# Patient Record
Sex: FEMALE | Race: ASIAN | URBAN - METROPOLITAN AREA
[De-identification: names, ages, dates, MRNs, and addresses within clinical notes are randomized per-mention and may not be internally consistent; named-entity substitution may affect disease eponyms.]

---

## 2018-01-21 ENCOUNTER — HOSPITAL ENCOUNTER (EMERGENCY)
Facility: CLINIC | Age: 53
Discharge: HOME | End: 2018-01-21
Attending: NURSE PRACTITIONER | Admitting: NURSE PRACTITIONER
Payer: COMMERCIAL

## 2018-01-21 ENCOUNTER — TRANSFERRED RECORDS (OUTPATIENT)
Dept: HEALTH INFORMATION MANAGEMENT | Facility: CLINIC | Age: 53
End: 2018-01-21

## 2018-01-21 VITALS
HEIGHT: 60 IN | TEMPERATURE: 98.7 F | OXYGEN SATURATION: 98 % | RESPIRATION RATE: 16 BRPM | WEIGHT: 125 LBS | DIASTOLIC BLOOD PRESSURE: 92 MMHG | SYSTOLIC BLOOD PRESSURE: 135 MMHG | BODY MASS INDEX: 24.54 KG/M2

## 2018-01-21 DIAGNOSIS — L03.213 PERIORBITAL CELLULITIS OF LEFT EYE: ICD-10-CM

## 2018-01-21 PROCEDURE — 25000125 ZZHC RX 250: Performed by: EMERGENCY MEDICINE

## 2018-01-21 PROCEDURE — 99284 EMERGENCY DEPT VISIT MOD MDM: CPT | Mod: 25

## 2018-01-21 PROCEDURE — 96372 THER/PROPH/DIAG INJ SC/IM: CPT

## 2018-01-21 PROCEDURE — 25000128 H RX IP 250 OP 636: Performed by: EMERGENCY MEDICINE

## 2018-01-21 RX ORDER — TETRACAINE HYDROCHLORIDE 5 MG/ML
SOLUTION OPHTHALMIC
Status: DISCONTINUED
Start: 2018-01-21 | End: 2018-01-21 | Stop reason: HOSPADM

## 2018-01-21 RX ORDER — CLINDAMYCIN HCL 150 MG
450 CAPSULE ORAL 3 TIMES DAILY
Qty: 90 CAPSULE | Refills: 0 | Status: SHIPPED | OUTPATIENT
Start: 2018-01-21 | End: 2018-01-31

## 2018-01-21 RX ADMIN — CEFTRIAXONE SODIUM 2 G: 1 INJECTION, POWDER, FOR SOLUTION INTRAMUSCULAR; INTRAVENOUS at 18:27

## 2018-01-21 ASSESSMENT — ENCOUNTER SYMPTOMS
CHILLS: 0
HEADACHES: 0
RHINORRHEA: 0
EYE PAIN: 1
FACIAL SWELLING: 1
FEVER: 0
EYE DISCHARGE: 1

## 2018-01-21 ASSESSMENT — VISUAL ACUITY
OD: 20/50
OS: 20/25

## 2018-01-21 NOTE — ED AVS SNAPSHOT
[facility]    [address] [place] [address]    Phone:  [phone]    Fax:  [phone]                                       Sonja Fregoso   MRN: [id number]    Department:  [facility]   Date of Visit:  1/21/2018           Patient Information     Date Of Birth          1965        Your diagnoses for this visit were:     Periorbital cellulitis of left eye        You were seen by Ivette Delong APRN CNP and Aubrey Diggs MD.        Discharge Instructions       YOU NEED TO FOLLOW UP WITH THE EYE DOCTOR IN THE NEXT 2-3 DAYS AND RETURN TO THE ED ANY SOONER FOR ANY WORSENING SYMPTOMS.     Periorbital Cellulitis  Periorbital cellulitis is an infection of the tissues around the eye. It is most often caused by an infected scratch or insect bite. Sometimes a sinus infection can cause this problem.  Home care  The following are general care guidelines:  1. Take your antibiotic medicine exactly as directed, until it is finished.  2. You may use over-the-counter medicine as directed based on age and weight to help with pain and fever, unless another pain medicine was given. If you have liver disease or ever had a stomach ulcer, talk with your healthcare provider before using these medicines. Do not use ibuprofen in children under 6 months of age. Aspirin should never be used in anyone under 18 years of age who is ill with a fever. It may cause severe illness or death.  Follow-up care  Follow up with your healthcare provider, or as advised.  When to seek medical advice  Call your healthcare provider right away if any of these occur:    Increasing swelling or pain around the eye    Increasing redness    Changes in vision    Fever of 100.4 (38  C) oral or 101.5 (38.6  C) rectal for more than 2 days on antibiotics  Date Last Reviewed: 6/1/2016 2000-2017 [employer]. [address] [place] [address], [place] [address]. All  rights reserved. This information is not intended as a substitute for professional medical care. Always follow your healthcare professional's instructions.          24 Hour Appointment Hotline       To make an appointment at any [facility], call [phone] ([phone]). If you don't have a family doctor or clinic, we will help you find one. [place] clinics are conveniently located to serve the needs of you and your family.             Review of your medicines      START taking        Dose / Directions Last dose taken    clindamycin 150 MG capsule   Commonly known as:  CLEOCIN   Dose:  450 mg   Quantity:  90 capsule        Take 3 capsules (450 mg) by mouth 3 times daily for 10 days   Refills:  0          Our records show that you are taking the medicines listed below. If these are incorrect, please call your family doctor or clinic.        Dose / Directions Last dose taken    AMLODIPINE BESYLATE PO        Refills:  0        NABUMETONE PO        Refills:  0        SIMVASTATIN PO        Refills:  0                Prescriptions were sent or printed at these locations (1 Prescription)                   Other Prescriptions                Printed at Department/Unit printer (1 of 1)         clindamycin (CLEOCIN) 150 MG capsule                Orders Needing Specimen Collection     None      Pending Results     No orders found from 1/19/2018 to 1/22/2018.            Pending Culture Results     No orders found from 1/19/2018 to 1/22/2018.            Pending Results Instructions     If you had any lab results that were not finalized at the time of your Discharge, you can call the ED Lab Result RN at [phone]. You will be contacted by this team for any positive Lab results or changes in treatment. The nurses are available 7 days a week from 10A to 6:30P.  You can leave a message 24 hours per day and they will return your call.        Test Results From Your Hospital Stay               Clinical Quality Measure:  Blood Pressure Screening     Your blood pressure was checked while you were in the emergency department today. The last reading we obtained was  BP: (!) 135/92 . Please read the guidelines below about what these numbers mean and what you should do about them.  If your systolic blood pressure (the top number) is less than 120 and your diastolic blood pressure (the bottom number) is less than 80, then your blood pressure is normal. There is nothing more that you need to do about it.  If your systolic blood pressure (the top number) is 120-139 or your diastolic blood pressure (the bottom number) is 80-89, your blood pressure may be higher than it should be. You should have your blood pressure rechecked within a year by a primary care provider.  If your systolic blood pressure (the top number) is 140 or greater or your diastolic blood pressure (the bottom number) is 90 or greater, you may have high blood pressure. High blood pressure is treatable, but if left untreated over time it can put you at risk for heart attack, stroke, or kidney failure. You should have your blood pressure rechecked by a primary care provider within the next 4 weeks.  If your provider in the emergency department today gave you specific instructions to follow-up with your doctor or provider even sooner than that, you should follow that instruction and not wait for up to 4 weeks for your follow-up visit.        Thank you for choosing [facility]       Thank you for choosing [facility] for your care. Our goal is always to provide you with excellent care. Hearing back from our patients is one way we can continue to improve our services. Please take a few minutes to complete the written survey that you may receive in the mail after you visit with us. Thank you!        [employer] Information     [employer] lets you send messages to your doctor, view your test results, renew your prescriptions, schedule appointments and more. To sign up, go to  [email or web address] . Click on \"Log in\" on the left side of the screen, which will take you to the Welcome page. Then click on \"Sign up Now\" on the right side of the page.     You will be asked to enter the access code listed below, as well as some personal information. Please follow the directions to create your username and password.     Your access code is: [email or web address]  Expires: 2018  6:12 PM     Your access code will  in 90 days. If you need help or a new code, please call your [place] clinic or [phone].        Care EveryWhere ID     This is your Care EveryWhere ID. This could be used by other organizations to access your [facility] medical records  [id number]        Equal Access to Services     BK LOPEZ : Kendra Enamorado, lily haynes, jacky rios, leny euceda. So [facility] [place] [phone].    ATENCIÓN: Si habla español, tiene a morales disposición servicios gratuitos de asistencia lingüística. Jayson al [phone].    We comply with applicable federal civil rights laws and [place] laws. We do not discriminate on the basis of race, color, national origin, age, disability, sex, sexual orientation, or gender identity.            After Visit Summary       This is your record. Keep this with you and show to your community pharmacist(s) and doctor(s) at your next visit.                  "

## 2018-01-21 NOTE — DISCHARGE INSTRUCTIONS
YOU NEED TO FOLLOW UP WITH THE EYE DOCTOR IN THE NEXT 2-3 DAYS AND RETURN TO THE ED ANY SOONER FOR ANY WORSENING SYMPTOMS.     Periorbital Cellulitis  Periorbital cellulitis is an infection of the tissues around the eye. It is most often caused by an infected scratch or insect bite. Sometimes a sinus infection can cause this problem.  Home care  The following are general care guidelines:  1. Take your antibiotic medicine exactly as directed, until it is finished.  2. You may use over-the-counter medicine as directed based on age and weight to help with pain and fever, unless another pain medicine was given. If you have liver disease or ever had a stomach ulcer, talk with your healthcare provider before using these medicines. Do not use ibuprofen in children under 6 months of age. Aspirin should never be used in anyone under 18 years of age who is ill with a fever. It may cause severe illness or death.  Follow-up care  Follow up with your healthcare provider, or as advised.  When to seek medical advice  Call your healthcare provider right away if any of these occur:    Increasing swelling or pain around the eye    Increasing redness    Changes in vision    Fever of 100.4 (38  C) oral or 101.5 (38.6  C) rectal for more than 2 days on antibiotics  Date Last Reviewed: 6/1/2016 2000-2017 [employer]. [address] [place] [address], [place] [address]. All rights reserved. This information is not intended as a substitute for professional medical care. Always follow your healthcare professional's instructions.

## 2018-01-21 NOTE — ED PROVIDER NOTES
History     Chief Complaint:  Facial Swelling    HPI   Leaphea Chem is an otherwise healthy 52 year old female who presents to the emergency department today for evaluation of left facial swelling. The patient reports she woke up with mild left-sided facial swelling 4 days ago, that worsened the following day and continued to persist into today. She reports pain with the facial swelling, blurry vision out of the left eye, burning left eye pain, and mild left eye discharge. The pain is primarily ariadna-orbital and mild. She has no diplopia or pain with extra ocular movement.  The patient denies history of diabetes. The patient denies recent injuries to the face, falls, or cold. The patient denies runny nose, headache, chills, fever, or ear pain.     Allergies:  No Known Drug Allergies    Medications:    Amlodipine   Simvastatin  Nabumetone     Past Medical History:    Past medical history reviewed. No pertinent past medical history.     Past Surgical History:    Surgical history reviewed. No pertinent surgical history.     Family History:    History reviewed. No pertinent family history.     Social History:  The patient was accompanied to the ED by son.  Smoking Status: Never Smoker  Alcohol Use: Negative    Marital Status:        Review of Systems   Constitutional: Negative for chills and fever.   HENT: Positive for facial swelling (left). Negative for ear pain and rhinorrhea.    Eyes: Positive for pain (left) and discharge (left).   Neurological: Negative for headaches.   All other systems reviewed and are negative.    Physical Exam     Patient Vitals for the past 24 hrs:   BP Temp Temp src Heart Rate Resp SpO2 Height Weight   01/21/18 1608 (!) 135/92 98.7  F (37.1  C) Oral 73 16 98 % 1.524 m (5') 56.7 kg (125 lb)     Physical Exam   HENT:   Head:           General:   Pleasant, age appropriate.     Resting comfortably in the bed.  HEENT:    Oropharynx is moist, without lesions or trismus.  Eyes:    Right  pupil 3 mm and reactive     Left pupil 5 mm and reactive     EOMs intact without pain     Conjunctiva erythema on left     Left eye:       IOP 21      No hyphema or hypopyon.      Wood's lamp with fluorescein:        Negative Cosmo's test, no signs of open globe.       No dendritic lesions.       No corneal ulcerations or abrasions      Fundoscopic exam: no retinal hemorrhages or pallor.       No optic disc edema/papilledema.       No defect to the retina suggestive of detachment.  Neck:    Supple, no meningismus.     CV:     Regular rate and rhythm.      No murmurs, rubs or gallops.  PULM:    Clear to auscultation bilateral.       No respiratory distress.      Good air exchange.  ABD:    Soft, non-tender, non-distended.      No rebound, guarding or rigidity.  MSK:     No gross deformity to all four extremities.   LYMPH:   No cervical lymphadenopathy.  NEURO:   Alert.     Good muscular tone.  Skin:    Warm, dry and intact.    Psych:    Mood is good and affect is appropriate.          Emergency Department Course     Interventions:  1827 Rocephin 2 g in 1% IV infusion    Emergency Department Course:  Nursing notes and vitals reviewed.     1635 I performed an exam of the patient as documented above.     Procedure performed as noted above.    1710 I consulted with Dr. Bergman, Opthamology, regarding the patient's history and presentation here in the emergency department.    1719 I rechecked the patient and discussed the results of her workup thus far.     Findings and plan explained to the Patient. Patient discharged home with instructions regarding supportive care, medications, and reasons to return. The importance of close follow-up was reviewed. The patient was prescribed clindamycin.    I personally reviewed the laboratory results with the Patient and answered all related questions prior to discharge.           Impression & Plan      Medical Decision Makin-year-old female seen in the ED with left periorbital  swelling, and was referred from an outside clinic to evaluate for preseptal versus orbital cellulitis.  On examination, findings are most suggestive of preseptal cellulitis.  She has no pain with extraocular movement, proptosis, or ptosis.  There is no evidence of corneal abrasion or ulceration.  She was noted to have anisocoria which is uncertain whether this is new or old.  I spoke with the ophthalmologist on call regarding the findings of left-sided mydriasis and whether that would indicate a need for further investigation or more aggressive management.  Ophthalmology did not feel that that alone would signal more concern for orbital cellulitis that would require advanced imaging.  Patient given IM ceftriaxone.  She will be discharged home on clindamycin and have urgent follow-up in clinic in 2 days and return to ED for any worsening symptoms.    Diagnosis:    ICD-10-CM   1. Periorbital cellulitis of left eye L03.213       Disposition:  discharged to home.    Discharge Medications:   Details   clindamycin (CLEOCIN) 150 MG capsule Take 3 capsules (450 mg) by mouth 3 times daily for 10 days, Disp-90 capsule, R-0, Local Print       IJomar, am serving as a scribe on 1/21/2018 at 4:35 PM to personally document services performed by Aubrey Diggs MD based on my observations and the provider's statements to me.       1/21/2018   [facility] EMERGENCY DEPARTMENT       Aubrey Diggs MD  01/21/18 7602

## 2018-01-21 NOTE — ED AVS SNAPSHOT
[facility]    [address]    [facility] [address]    Phone:  [phone]    Fax:  [phone]                                       Sonja Fregoso   MRN: [id number]    Department:  [facility]   Date of Visit:  1/21/2018           After Visit Summary Signature Page     I have received my discharge instructions, and my questions have been answered. I have discussed any challenges I see with this plan with the nurse or doctor.    ..........................................................................................................................................  Patient/Patient Representative Signature      ..........................................................................................................................................  Patient Representative Print Name and Relationship to Patient    ..................................................               ................................................  Date                                            Time    ..........................................................................................................................................  Reviewed by Signature/Title    ...................................................              ..............................................  Date                                                            Time

## 2018-01-22 ENCOUNTER — OFFICE VISIT (OUTPATIENT)
Dept: OPHTHALMOLOGY | Facility: CLINIC | Age: 53
End: 2018-01-22
Attending: OPHTHALMOLOGY
Payer: COMMERCIAL

## 2018-01-22 DIAGNOSIS — L03.213 PRESEPTAL CELLULITIS OF LEFT EYE: Primary | ICD-10-CM

## 2018-01-22 PROCEDURE — G0463 HOSPITAL OUTPT CLINIC VISIT: HCPCS | Mod: ZF

## 2018-01-22 ASSESSMENT — CONF VISUAL FIELD
OS_NORMAL: 1
OD_NORMAL: 1

## 2018-01-22 ASSESSMENT — TONOMETRY
OS_IOP_MMHG: 22
OS_IOP_MMHG: 23
IOP_METHOD: TONOPEN
OD_IOP_MMHG: 15
IOP_METHOD: TONOPEN

## 2018-01-22 ASSESSMENT — VISUAL ACUITY
OS_SC: 20/40
OD_SC: 20/20
METHOD: SNELLEN - LINEAR

## 2018-01-22 ASSESSMENT — CUP TO DISC RATIO
OS_RATIO: 0.35
OD_RATIO: 0.3

## 2018-01-22 ASSESSMENT — SLIT LAMP EXAM - LIDS: COMMENTS: NORMAL

## 2018-01-22 ASSESSMENT — EXTERNAL EXAM - RIGHT EYE: OD_EXAM: NORMAL

## 2018-01-22 NOTE — LETTER
Dear Sir or Madam:  Let it be known that Sonja Fregoso was seen and evaluated under my medical supervision today January 22nd, 2018. Due to her eye condition she should not work the remainder of this week and should be excused from her work related duties and activities. Thank you for your consideration in this matter.         Walker Kowalski MD      =

## 2018-01-22 NOTE — MR AVS SNAPSHOT
After Visit Summary   2018    Sonja Fregoso    MRN: [id number]           Patient Information     Date Of Birth          1965        Visit Information        Provider Department      2018 12:45 PM Walker Kowalski MD; PHONE,  Eye Clinic        Today's Diagnoses     Preseptal cellulitis of left eye    -  1       Follow-ups after your visit        Follow-up notes from your care team     Return in about 1 week (around 2018) for Follow Up.      Who to contact     Please call your clinic at [phone] to:    Ask questions about your health    Make or cancel appointments    Discuss your medicines    Learn about your test results    Speak to your doctor   If you have compliments or concerns about an experience at your clinic, or if you wish to file a complaint, please contact [facility] Patient Relations at [phone] or email us at [email or web address]         Additional Information About Your Visit        MyChart Information     [employer] is an electronic gateway that provides easy, online access to your medical records. With Webs, you can request a clinic appointment, read your test results, renew a prescription or communicate with your care team.     To sign up for [employer] visit the website at [email or web address]   You will be asked to enter the access code listed below, as well as some personal information. Please follow the directions to create your username and password.     Your access code is: [email or web address]  Expires: 2018  6:12 PM     Your access code will  in 90 days. If you need help or a new code, please contact your [facility] Clinic or call [phone] for assistance.        Care EveryWhere ID     This is your Care EveryWhere ID. This could be used by other organizations to access your [facility] medical records  [id number]         Blood Pressure from Last 3 Encounters:    01/21/18 (!) 135/92    Weight from Last 3 Encounters:   01/21/18 56.7 kg (125 lb)              Today, you had the following     No orders found for display       Primary Care Provider Office Phone # Fax #    Tara Gonzales [phone]       [facility] [address]  [facility] [address]        Equal Access to Services     [facility] : Hadii aad ku hadasho Soomaali, waaxda luqadaha, qaybta kaalmada adeegyada, waxay idiin hayaan adeeg kharash la'aan . So [facility] [phone].    ATENCIÓN: Si habla espvamsi, tiene a morales disposición servicios gratuitos de asistencia lingüística. Jayson al [phone].    We comply with applicable federal civil rights laws and [place] laws. We do not discriminate on the basis of race, color, national origin, age, disability, sex, sexual orientation, or gender identity.            Thank you!     Thank you for choosing [facility] CLINIC  for your care. Our goal is always to provide you with excellent care. Hearing back from our patients is one way we can continue to improve our services. Please take a few minutes to complete the written survey that you may receive in the mail after your visit with us. Thank you!             Your Updated Medication List - Protect others around you: Learn how to safely use, store and throw away your medicines at www.disposemymeds.org.          This list is accurate as of 1/22/18 11:59 PM.  Always use your most recent med list.                   Brand Name Dispense Instructions for use Diagnosis    AMLODIPINE BESYLATE PO           clindamycin 150 MG capsule    CLEOCIN    90 capsule    Take 3 capsules (450 mg) by mouth 3 times daily for 10 days        NABUMETONE PO           SIMVASTATIN PO

## 2018-01-22 NOTE — PROGRESS NOTES
CC: Eyelid swelling    HPI: Sonja Fregoso is a 52 year old female who presents for evaluation of preseptal cellulitis. Her left ariadna-ocular area has been swollen for 4 days. Reports that she woke up Friday morning with swelling and pain of the left ariadna-ocular area. Patient went to the ED yesterday and was given Clindamycin which has helped a little with swelling. Concerned about eyelashes rubbing her left eye. Reports blurry vision in left eye. Reports pain with eye movement. Reports having recent URI preceding right eye swelling. Denies history of diabetes mellitus, immunosuppression, or immunocompromised state.     POHx:  None    Current Eye Medications:   Clindamycin 150 mg TID    Review of Testing:  NA    Assessment & Plan   Sonja Fregoso is a 52 year old female with the following diagnoses:     1. Preseptal cellulitis,left.  Patient's history is concerning for pre-septal cellulitis vs. Orbital cellulitis. She is previously healthy without diabetes or immuncompromised and likely related to her recent URI. Her vision is slightly decreased in the left eye to 20/40, and patient reports similar vision in both eyes before this. Extraocular motility was full in both eyes, there was NO afferent pupil defect on examination. IOP was slightly elevated to 22, and 23 on the left side. Slit lamp examination showed mild injection and chemosis with small subconjunctival hemorrhage superiorly. Patient is currently using Clindamycin since yesterday with subjective improvement.  -Continue Clindamycin TID for 10 days  -Return to clinic in 1 week if not improved  -Discussed seeking urgent evaluation if worsened and CT     Discussed with Dr. Jean.    Walker Kowalski MD  Ophthalmology, PGY-3        Teaching statement:  Complete documentation of historical and exam elements from today's encounter can be found in the full encounter summary report (not reduplicated in this progress note). I personally obtained the chief complaint(s)  and history of present illness.  I confirmed and edited as necessary the review of systems, past medical/surgical history, family history, social history, and examination findings as documented by others; and I examined the patient myself. I personally reviewed the relevant tests, images, and reports as documented above.     I formulated and edited as necessary the assessment and plan and discussed the findings and management plan with the patient and family.    Sandrita Jean MD  Comprehensive Ophthalmology & Ocular Pathology  Department of Ophthalmology and Visual Neurosciences  [email or web address]  Pager [phone]

## 2018-01-22 NOTE — ED NOTES
MD in to see patient and discuss diagnosis, test results, and discharge plan. Patient meets discharge criteria. Discussed AVS with patient. Questions answered. Patient verbalized understanding. Patient reports being ready to go home. Patient discharged home with famliy by car with all necessary information.

## 2018-01-22 NOTE — NURSING NOTE
No chief complaint on file.    HPI    Symptoms:     No decreased vision   Puffy eyes   Redness   Foreign body sensation      Duration:  3 days   Frequency:  Constant       Do you have eye pain now?:  Yes   Location:  OS   Pain Level:  Extreme Pain (8), Mild Pain (3)   Pain Duration:  4 days   Pain Frequency:  Constant   Pain Characteristics:  Burning      Comments: No chief complaint on file.    HPI    Symptoms:     No decreased vision   Puffy eyes   Redness   Foreign body sensation   Photophobia      Duration:  3 days   Frequency:  Constant       Do you have eye pain now?:  Yes   Location:  OS   Pain Level:  Extreme Pain (8), Mild Pain (3)   Pain Duration:  4 days   Pain Frequency:  Constant   Pain Characteristics:  Burning      Comments:No chief complaint on file.    HPI    Symptoms:     No decreased vision   Puffy eyes   Redness   Foreign body sensation   Photophobia      Duration:  3 days   Frequency:  Constant       Do you have eye pain now?:  Yes   Location:  OS   Pain Level:  Extreme Pain (8), Mild Pain (3)   Pain Duration:  4 days   Pain Frequency:  Constant   Pain Characteristics:  Burning      Comments:  Woke up Friday morning with above signs/symptoms, which are intensifying each day.  Given Cleocin prescription yesterday by Diana QUACH / see notes.  Onset of headache this morning.  Denies injury, chemical exposure. States last eye exam 2 years ago with normal results.  Intermittently wears prescription glasses for reading from that exam.    Geraldine Locke COT 1:23 PM 01/22/2018

## 2018-02-14 ENCOUNTER — OFFICE VISIT (OUTPATIENT)
Dept: OPHTHALMOLOGY | Facility: CLINIC | Age: 53
End: 2018-02-14
Attending: OPHTHALMOLOGY
Payer: COMMERCIAL

## 2018-02-14 DIAGNOSIS — H15.122 NODULAR EPISCLERITIS OF LEFT EYE: Primary | ICD-10-CM

## 2018-02-14 DIAGNOSIS — L03.211 CELLULITIS OF FACE: ICD-10-CM

## 2018-02-14 RX ORDER — FLUOROMETHOLONE 0.1 %
1 SUSPENSION, DROPS(FINAL DOSAGE FORM)(ML) OPHTHALMIC (EYE) 3 TIMES DAILY
Qty: 1 BOTTLE | Refills: 0 | Status: SHIPPED | OUTPATIENT
Start: 2018-02-14

## 2018-02-14 ASSESSMENT — VISUAL ACUITY
OS_SC+: -1
OD_SC+: +2
OD_SC: 20/25
OD_PH_SC: 20/20-2
OS_SC: 20/20
METHOD: SNELLEN - LINEAR

## 2018-02-14 ASSESSMENT — EXTERNAL EXAM - LEFT EYE: OS_EXAM: NORMAL

## 2018-02-14 ASSESSMENT — CONF VISUAL FIELD
OD_NORMAL: 1
OS_NORMAL: 1

## 2018-02-14 ASSESSMENT — SLIT LAMP EXAM - LIDS: COMMENTS: NORMAL

## 2018-02-14 ASSESSMENT — CUP TO DISC RATIO
OS_RATIO: .35
OD_RATIO: .35

## 2018-02-14 ASSESSMENT — TONOMETRY
IOP_METHOD: TONOPEN
OD_IOP_MMHG: 13
OS_IOP_MMHG: 11

## 2018-02-14 ASSESSMENT — EXTERNAL EXAM - RIGHT EYE: OD_EXAM: NORMAL

## 2018-02-14 NOTE — PROGRESS NOTES
OLIVIA Fregoso is a 52 year old female who presents for follow up from preseptal cellulitis. She was treated with 10 days of clindamycin 150 mg TID with improvement. She complains of continued conjunctival injection in the left eye since the infection.    Assessment & Plan      (H15.122) Nodular episcleritis of left eye  (primary encounter diagnosis)  Comment: likely residual from preseptal cellulitis  Plan: fluorometholone (FML LIQUIFILM) 0.1 %         ophthalmic susp Three times a day for one week then two times a day for one week then once a day for one week then discontinue.    (L03.211) Cellulitis of face  Comment: Preseptal cellulitis of left eye, now resolved s/p Clindamycin 150 mg TID for 10 days  Plan: monitor    -----------------------------------------------------------------------------------    Patient disposition:   Return if symptoms worsen or fail to improve. or sooner as needed.    Brittany Ibrahim MD  PGY-2 Ophthalmology  [phone]    Teaching statement:  Complete documentation of historical and exam elements from today's encounter can be found in the full encounter summary report (not reduplicated in this progress note). I personally obtained the chief complaint(s) and history of present illness.  I confirmed and edited as necessary the review of systems, past medical/surgical history, family history, social history, and examination findings as documented by others; and I examined the patient myself. I personally reviewed the relevant tests, images, and reports as documented above.     I formulated and edited as necessary the assessment and plan and discussed the findings and management plan with the patient and family.        Sandrita Jean MD  Comprehensive Ophthalmology & Ocular Pathology  Department of Ophthalmology and Visual Neurosciences  [email or web address]  Pager [phone]

## 2018-02-14 NOTE — MR AVS SNAPSHOT
After Visit Summary   2018    Sonja Fregoso    MRN: [id number]           Patient Information     Date Of Birth          1965        Visit Information        Provider Department      2018 8:00 AM Brittany Ibrahim MD; V[facility],  [facility]        Today's Diagnoses     Nodular episcleritis of left eye    -  1    Cellulitis of face          Care Instructions    Start eye drop three times a day in the left eye for one week then two times a day for one week then one time a day for one week then discontinue.          Follow-ups after your visit        Follow-up notes from your care team     Return if symptoms worsen or fail to improve.      Who to contact     Please call your clinic at [phone] to:    Ask questions about your health    Make or cancel appointments    Discuss your medicines    Learn about your test results    Speak to your doctor            Additional Information About Your Visit        MyChart Information     Reactivity is an electronic gateway that provides easy, online access to your medical records. With Reactivity, you can request a clinic appointment, read your test results, renew a prescription or communicate with your care team.     To sign up for [employer] visit the website at [email or web address]   You will be asked to enter the access code listed below, as well as some personal information. Please follow the directions to create your username and password.     Your access code is: [email or web address]  Expires: 2018  6:12 PM     Your access code will  in 90 days. If you need help or a new code, please contact your [facility] or call [phone] for assistance.        Care EveryWhere ID     This is your Care EveryWhere ID. This could be used by other organizations to access your [place] medical records  [id number]         Blood Pressure from Last 3 Encounters:   18 (!) 135/92    Weight from Last 3 Encounters:    01/21/18 56.7 kg (125 lb)              Today, you had the following     No orders found for display         Today's Medication Changes          These changes are accurate as of 2/14/18 11:59 PM.  If you have any questions, ask your nurse or doctor.               Start taking these medicines.        Dose/Directions    fluorometholone 0.1 % ophthalmic susp   Commonly known as:  FML LIQUIFILM   Used for:  Nodular episcleritis of left eye, Cellulitis of face   Started by:  Brittany Ibrahim MD        Dose:  1 drop   Place 1 drop Into the left eye 3 times daily   Quantity:  1 Bottle   Refills:  0            Where to get your medicines      These medications were sent to [facility] [id number]  [place] [address] [facility]  [address] [address]     Phone:  [phone]     fluorometholone 0.1 % ophthalmic susp                Primary Care Provider Office Phone # Fax #    Tara Amita Gonzales [phone]       [facility] [address] [address] [address]        Equal Access to Services     [facility] AH: Hadii hieu majano hadasho Sodanielle, waaxda luqadaha, qaybta kaalmada adeegyalucy, leny up . So [facility] [place] [phone].    ATENCIÓN: Si habla español, tiene a morales disposición servicios gratuitos de asistencia lingüística. [facility] [phone].    We comply with applicable federal civil rights laws and [place] laws. We do not discriminate on the basis of race, color, national origin, age, disability, sex, sexual orientation, or gender identity.            Thank you!     Thank you for choosing [facility]  for your care. Our goal is always to provide you with excellent care. Hearing back from our patients is one way we can continue to improve our services. Please take a few minutes to complete the written survey that you may receive in the mail after your visit with us. Thank you!             Your Updated Medication List -  Protect others around you: Learn how to safely use, store and throw away your medicines at www.disposemymeds.org.          This list is accurate as of 2/14/18 11:59 PM.  Always use your most recent med list.                   Brand Name Dispense Instructions for use Diagnosis    AMLODIPINE BESYLATE PO           fluorometholone 0.1 % ophthalmic susp    FML LIQUIFILM    1 Bottle    Place 1 drop Into the left eye 3 times daily    Nodular episcleritis of left eye, Cellulitis of face       NABUMETONE PO           SIMVASTATIN PO

## 2018-02-14 NOTE — PATIENT INSTRUCTIONS
Start eye drop three times a day in the left eye for one week then two times a day for one week then one time a day for one week then discontinue.